# Patient Record
Sex: MALE | Race: WHITE | Employment: FULL TIME | URBAN - METROPOLITAN AREA
[De-identification: names, ages, dates, MRNs, and addresses within clinical notes are randomized per-mention and may not be internally consistent; named-entity substitution may affect disease eponyms.]

---

## 2018-06-07 ENCOUNTER — HOSPITAL ENCOUNTER (EMERGENCY)
Facility: CLINIC | Age: 23
Discharge: HOME | End: 2018-06-07
Attending: EMERGENCY MEDICINE | Admitting: EMERGENCY MEDICINE
Payer: COMMERCIAL

## 2018-06-07 VITALS
HEART RATE: 80 BPM | OXYGEN SATURATION: 100 % | DIASTOLIC BLOOD PRESSURE: 76 MMHG | SYSTOLIC BLOOD PRESSURE: 122 MMHG | RESPIRATION RATE: 18 BRPM | TEMPERATURE: 98 F

## 2018-06-07 DIAGNOSIS — F32.A DEPRESSION, UNSPECIFIED DEPRESSION TYPE: ICD-10-CM

## 2018-06-07 DIAGNOSIS — R45.851 SUICIDAL IDEATION: ICD-10-CM

## 2018-06-07 PROCEDURE — 90791 PSYCH DIAGNOSTIC EVALUATION: CPT

## 2018-06-07 PROCEDURE — 99285 EMERGENCY DEPT VISIT HI MDM: CPT | Mod: 25

## 2018-06-07 ASSESSMENT — ENCOUNTER SYMPTOMS
VOMITING: 0
DIARRHEA: 0
FEVER: 0
SHORTNESS OF BREATH: 0
HEADACHES: 0
NAUSEA: 0
CONSTIPATION: 0
HALLUCINATIONS: 0
EYES NEGATIVE: 1
ABDOMINAL PAIN: 0

## 2018-06-07 NOTE — ED TRIAGE NOTES
A&O x4, ABCDs intact. Pt arrives via EMS for suicidal ideation. Pt states he had a knife sitting in his lap today before a neighbor took it away. Pt has planned suicide in the past by tying a rope in a know, but did not follow-through.

## 2018-06-07 NOTE — ED AVS SNAPSHOT
[facility] Emergency Department    [address]    [facility] [address]    Phone:  [phone]    Fax:  [phone]                                       Major Woods   MRN: [id number]    Department:  [facility] Emergency Department   Date of Visit:  6/7/2018           After Visit Summary Signature Page     I have received my discharge instructions, and my questions have been answered. I have discussed any challenges I see with this plan with the nurse or doctor.    ..........................................................................................................................................  Patient/Patient Representative Signature      ..........................................................................................................................................  Patient Representative Print Name and Relationship to Patient    ..................................................               ................................................  Date                                            Time    ..........................................................................................................................................  Reviewed by Signature/Title    ...................................................              ..............................................  Date                                                            Time

## 2018-06-07 NOTE — ED TRIAGE NOTES
Per EMS patient called friends making suicidal comments. Friends called 911 and when police arrived patient was holding a knife stating he would cut his wrists. ABC intact without need for intervention.

## 2018-06-07 NOTE — ED PROVIDER NOTES
"  History     Chief Complaint:  Suicidal    The history is provided by the patient.      Major Woods is a 23 year old male with a history of depression who presents for evaluation of suicidal ideation. EMS report that the patient called his friends earlier today making suicidal comments. Friends called police, who found the patient on his front porch holding a knife. On presentation, the patient states that he was contemplating cutting himself with the knife, and states that he has intentionally cut his own arms and legs before. Patient's caretaker notes that the patient stopped taking his prescribed Concerta and Prozac 6 months ago, but that she could not force him to take them as he is above the age of 20. Patient lives at home with his fiance and her brother, and states that while this living situation is mostly safe, that it is stressful as his fiance wants to leave him. Patient additionally endorses a sporadic history of marijuana use. Patient denies hallucinations, fever, chest pain, shortness of breath, abdominal pain, nausea, vomiting, diarrhea, constipation, headaches, vision changes, alcohol or other \"hard drug\" intake, or other medical complaint.    Allergies:  Nickel      Medications:    The patient is not currently taking any prescribed medications.     Past Medical History:    ADHD  Anxiety  Depressive disorder    Past Surgical History:    Repair tendon, finger    Family History:    History reviewed. No pertinent family history.      Social History:  Presents with caretaker   Tobacco use: former smoker  Alcohol use: no  PC[facility]: [facility] [place]    Marital Status: Engaged     Review of Systems   Constitutional: Negative for fever.   Eyes: Negative.    Respiratory: Negative for shortness of breath.    Cardiovascular: Negative for chest pain.   Gastrointestinal: Negative for abdominal pain, constipation, diarrhea, nausea and vomiting.   Neurological: Negative for headaches. "   Psychiatric/Behavioral: Positive for suicidal ideas. Negative for hallucinations.   All other systems reviewed and are negative.    Physical Exam     Patient Vitals for the past 24 hrs:   BP Temp Temp src Pulse Heart Rate Resp SpO2   06/07/18 2017 122/76 98  F (36.7  C) Oral 80 80 18 100 %   06/07/18 2000 - 98.2  F (36.8  C) Oral - - - -   06/07/18 1800 126/77 - - 96 96 14 97 %      Physical Exam  General: Alert, appears well-developed and well-nourished. Cooperative.     In mild distress  HEENT:  Head:  Atraumatic  Ears:  External ears are normal  Mouth/Throat:  Oropharynx is without erythema or exudate and mucous membranes are moist.   Eyes:   Conjunctivae normal and EOM are normal. No scleral icterus.  CV:  Normal rate, regular rhythm, normal heart sounds and radial pulses are 2+ and symmetric.  No murmur.  Resp:  Breath sounds are clear bilaterally    Non-labored, no retractions or accessory muscle use  GI:  Abdomen is soft, no distension, no tenderness. No rebound or guarding. No CVA tenderness bilaterally  MS:  Normal range of motion. No edema.    Normal strength in all 4 extremities.     Back atraumatic.    No midline cervical, thoracic, or lumbar tenderness  Skin:  Warm and dry.  No rash or lesions noted.  Neuro:   Alert. Normal strength.   GCS: 15  Psych: Normal mood and affect. Depressed mood, flat affect. Endorses SI, denies HI, denies hallucinations.     Emergency Department Course     Emergency Department Course:  Past medical records, nursing notes, and vitals reviewed.  1845: I performed an exam of the patient and obtained history, as documented above.    1854: DEC evaluated the patient and relayed to me their findings.     1953: I rechecked the patient. Patient reiterates denial of suicidal thoughts, homicidal ideation, or hallucinations. Findings and plan explained to the Patient. Patient discharged home with instructions regarding supportive care, medications, and reasons to return. The  importance of close follow-up was reviewed.    Impression & Plan      Medical Decision Making:  Patient is a 23-year-old male who presents with increased depression and suicidal ideation.  Patient denies suicidal ideation here in the emergency department.  He states that he was feeling increasingly suicidal earlier today but does not actively have a plan.  Patient with no illicit substance use recently and denies alcohol abuse.  Patient states he currently lives with a significant other although they are undergoing a stressful time of their relationship.  He notes this stressor as well as other social factors in creating worsening depression recently.  Patient feels that he can contract for safety here in the emergency department.  He does have close follow-up with his primary care as needed and we gave him referral to mental health resources as needed. DEC evaluated the patient and felt comfortable with a close outpatient follow-up program.  Patient will seek additional resources through his primary care provider and potentially set up outpatient counseling here in the next 1 week.  He was encouraged to return to emergency department if he develops increasing suicidal thoughts, develops homicidal ideations or hallucinations.  He denies all of these on my final recheck prior to discharge.  All questions answered prior to discharge.  Return precautions understood prior to discharge.  Discharged home.    Diagnosis:    ICD-10-CM   1. Suicidal ideation R45.851   2. Depression, unspecified depression type F32.9       Disposition:  Discharged to home with plan as outlined.       I, Enrique Eugene, am serving as a scribe at 6:13 PM on 6/7/2018 to document services personally performed by Vel Hanson MD based on my observations and the provider's statements to me.    6/7/2018   [facility] EMERGENCY DEPARTMENT     Vel Hanson MD  06/07/18 9443

## 2018-06-08 NOTE — DISCHARGE INSTRUCTIONS
Discharge Instructions  Mental Health Concerns    You were seen today for mental health concerns, such as depression, anxiety, or suicidal thinking. Your provider feels that you do not require hospitalization at this time. However, your symptoms may become worse, and you may need to return to the Emergency Department. Most treatments of depression and suicidal thoughts are a process rather than a single intervention.  Medications and counseling can take several weeks or more to help.    Generally, every Emergency Department visit should have a follow-up clinic visit with either a primary or a specialty clinic/provider. Please follow-up as instructed by your emergency provider today.    By accepting these discharge instructions:    You promise to not harm yourself or others.    You agree that if you feel you are becoming unable to keep that promise, you will do something to help yourself before you do anything to harm yourself or others.     You agree to keep any safety plan arranged on your visit here today.    You agree to take any medication prescribed or recommended by your provider.    If you are getting worse, you can contact a friend or a family member, contact your counselor or family provider, contact a crisis line, or other options discussed with the provider or therapist today.    At any time, you can call 911 and return to the Emergency Department for more help.    You understand that follow-up is essential to your treatment, and you will make and keep appointments recommended on your visit today.    How to improve your mental health and prevent suicide:    Involve others by letting family, friends, counselors know.  Do not isolate yourself.    Avoid alcohol or drugs. Remove weapons, poisons from your home.    Try to stick to routines for eating, sleeping and getting regular exercise.      Try to get into sunlight. Bright natural light not only treats seasonal affective disorder but also  depression.    Increase safe activities that you enjoy.    If you feel worse, contact [phone] ([phone]), or call 911, or your primary provider/counselor for additional assistance.    If you were given a prescription for medicine here today, be sure to read all of the information (including the package insert) that comes with your prescription.  This will include important information about the medicine, its side effects, and any warnings that you need to know about.  The pharmacist who fills the prescription can provide more information and answer questions you may have about the medicine.  If you have questions or concerns that the pharmacist cannot address, please call or return to the Emergency Department.   Remember that you can always come back to the Emergency Department if you are not able to see your regular provider in the amount of time listed above, if you get any new symptoms, or if there is anything that worries you.

## 2018-06-08 NOTE — ED NOTES
Assume care of patient, introduce self as patient nurse. Patient currently laying in bed, appears in no acute distress, parents at bedside, sitter one on one watch, will continue to monitor.

## 2018-07-02 ENCOUNTER — HOSPITAL ENCOUNTER (EMERGENCY)
Facility: CLINIC | Age: 23
Discharge: HOME | End: 2018-07-02
Attending: PSYCHIATRY & NEUROLOGY | Admitting: PSYCHIATRY & NEUROLOGY
Payer: COMMERCIAL

## 2018-07-02 VITALS
DIASTOLIC BLOOD PRESSURE: 51 MMHG | WEIGHT: 154 LBS | HEART RATE: 86 BPM | TEMPERATURE: 98.3 F | OXYGEN SATURATION: 97 % | SYSTOLIC BLOOD PRESSURE: 120 MMHG

## 2018-07-02 DIAGNOSIS — F43.21 ADJUSTMENT DISORDER WITH DEPRESSED MOOD: ICD-10-CM

## 2018-07-02 LAB
AMPHETAMINES UR QL SCN: NEGATIVE
BARBITURATES UR QL: NEGATIVE
BENZODIAZ UR QL: NEGATIVE
CANNABINOIDS UR QL SCN: NEGATIVE
COCAINE UR QL: NEGATIVE
ETHANOL UR QL SCN: NEGATIVE
OPIATES UR QL SCN: NEGATIVE

## 2018-07-02 PROCEDURE — 90791 PSYCH DIAGNOSTIC EVALUATION: CPT

## 2018-07-02 PROCEDURE — 80320 DRUG SCREEN QUANTALCOHOLS: CPT | Performed by: FAMILY MEDICINE

## 2018-07-02 PROCEDURE — 99284 EMERGENCY DEPT VISIT MOD MDM: CPT | Mod: Z6 | Performed by: PSYCHIATRY & NEUROLOGY

## 2018-07-02 PROCEDURE — 99285 EMERGENCY DEPT VISIT HI MDM: CPT | Mod: 25

## 2018-07-02 PROCEDURE — 80307 DRUG TEST PRSMV CHEM ANLYZR: CPT | Performed by: FAMILY MEDICINE

## 2018-07-02 ASSESSMENT — ENCOUNTER SYMPTOMS
HALLUCINATIONS: 0
HYPERACTIVE: 0
SHORTNESS OF BREATH: 0
FEVER: 0
ABDOMINAL PAIN: 0
DYSPHORIC MOOD: 1

## 2018-07-02 NOTE — ED AVS SNAPSHOT
[facility], [place], Emergency Department    [address]    [place] [address]    Phone:  [phone]    Fax:  [phone]                                       Major Woods   MRN: [id number]    Department:  [facility], Emergency Department   Date of Visit:  7/2/2018           After Visit Summary Signature Page     I have received my discharge instructions, and my questions have been answered. I have discussed any challenges I see with this plan with the nurse or doctor.    ..........................................................................................................................................  Patient/Patient Representative Signature      ..........................................................................................................................................  Patient Representative Print Name and Relationship to Patient    ..................................................               ................................................  Date                                            Time    ..........................................................................................................................................  Reviewed by Signature/Title    ...................................................              ..............................................  Date                                                            Time

## 2018-07-02 NOTE — ED AVS SNAPSHOT
[facility], Emergency Department    [address]    [place] [address]    Phone:  [phone]    Fax:  [phone]                                       Major Woods   MRN: [id number]    Department:  [facility], Emergency Department   Date of Visit:  7/2/2018           Patient Information     Date Of Birth          1995        Your diagnoses for this visit were:     Adjustment disorder with depressed mood        You were seen by Gavin Rodríguez MD.        Discharge Instructions       Follow up with the new medication provider set up for Wed July 25, 2018 at 3 pm    Follow up with your primary MD    24 Hour Appointment Hotline       To make an appointment at any [facility], call [phone] ([phone]). If you don't have a family doctor or clinic, we will help you find one. [facility] clinics are conveniently located to serve the needs of you and your family.             Review of your medicines      Our records show that you are taking the medicines listed below. If these are incorrect, please call your family doctor or clinic.        Dose / Directions Last dose taken    ARIPIPRAZOLE PO   Dose:  10 mg   Indication:  Major Depressive Disorder        Take 10 mg by mouth daily   Refills:  0                Procedures and tests performed during your visit     Drug abuse screen 6 urine (tox)      Orders Needing Specimen Collection     None      Pending Results     No orders found from 6/30/2018 to 7/3/2018.            Pending Culture Results     No orders found from 6/30/2018 to 7/3/2018.            Pending Results Instructions     If you had any lab results that were not finalized at the time of your Discharge, you can call the ED Lab Result RN at [phone]. You will be contacted by this team for any positive Lab results or changes in treatment. The nurses are available 7 days a week from 10A to 6:30P.  You can leave a message 24 hours per day and they will return your call.       "  Thank you for choosing [facility]       Thank you for choosing [facility] for your care. Our goal is always to provide you with excellent care. Hearing back from our patients is one way we can continue to improve our services. Please take a few minutes to complete the written survey that you may receive in the mail after you visit with us. Thank you!        [employer] Information     [employer] lets you send messages to your doctor, view your test results, renew your prescriptions, schedule appointments and more. To sign up, go to [email or web address] . Click on \"Log in\" on the left side of the screen, which will take you to the Welcome page. Then click on \"Sign up Now\" on the right side of the page.     You will be asked to enter the access code listed below, as well as some personal information. Please follow the directions to create your username and password.     Your access code is: [email or web address]  Expires: 2018  7:59 PM     Your access code will  in 90 days. If you need help or a new code, please call your [facility] clinic or [phone].        Care EveryWhere ID     This is your Care EveryWhere ID. This could be used by other organizations to access your [facility] medical records  [id number]        Equal Access to Services     SWAPNA AJ : Gealcio mercadoo Nicolas, waaxda luqadaha, qaybta kaalmada adejasonyada, christopher mayfield. So [facility] [phone].    ATENCIÓN: Si habla español, tiene a santana disposición servicios gratuitos de asistencia lingüística. Llame al [phone].    We comply with applicable federal civil rights laws and [place] laws. We do not discriminate on the basis of race, color, national origin, age, disability, sex, sexual orientation, or gender identity.            After Visit Summary       This is your record. Keep this with you and show to your community pharmacist(s) and doctor(s) at your next visit.                  "

## 2018-07-03 NOTE — DISCHARGE INSTRUCTIONS
Follow up with the new medication provider set up for Wed July 25, 2018 at 3 pm    Follow up with your primary MD

## 2018-07-03 NOTE — ED PROVIDER NOTES
"  History     Chief Complaint   Patient presents with     Suicidal     suicidal thoughts with no plans.     The history is provided by the patient and medical records.     Major Woods is a 23 year old male who comes in due to him texting some comments like \"I'm not sure what I am going to do.\"  This happened after he broke up with his fiancee when he caught her with another cordell today.  He had suspicions before but it was denied by the fiancee. They had signed a lease for a year this spring, so he is worried about his living situation.  This was what the text was about and not about any suicidal thoughts. He is not interested in therapy as it has not been helpful for him in the past. He works an overnight shift as a . He is on abilify from his primary provider for depression and has found it helpful.  He denies any suicidal or homicidal thoughts.     Please see the 's assessment in EPIC from today (7/2/18) for further details.    I have reviewed the Medications, Allergies, Past Medical and Surgical History, and Social History in the Epic system.    Review of Systems   Constitutional: Negative for fever.   Respiratory: Negative for shortness of breath.    Cardiovascular: Negative for chest pain.   Gastrointestinal: Negative for abdominal pain.   Psychiatric/Behavioral: Positive for dysphoric mood. Negative for hallucinations, self-injury and suicidal ideas. The patient is not hyperactive.    All other systems reviewed and are negative.      Physical Exam   BP: 123/75  Pulse: 95  Temp: 99.1  F (37.3  C)  Weight: 69.9 kg (154 lb)      Physical Exam   Constitutional: He is oriented to person, place, and time. He appears well-developed and well-nourished.   HENT:   Head: Normocephalic and atraumatic.   Mouth/Throat: Oropharynx is clear and moist. No oropharyngeal exudate.   Eyes: Pupils are equal, round, and reactive to light.   Neck: Normal range of motion. Neck supple.   Cardiovascular: Normal rate, " regular rhythm and normal heart sounds.    Pulmonary/Chest: Effort normal and breath sounds normal. No respiratory distress.   Abdominal: Soft. Bowel sounds are normal. There is no tenderness.   Musculoskeletal: Normal range of motion.   Neurological: He is alert and oriented to person, place, and time.   Skin: Skin is warm. No rash noted.   Psychiatric: He has a normal mood and affect. His speech is normal and behavior is normal. Judgment and thought content normal. He is not actively hallucinating. Thought content is not paranoid and not delusional. Cognition and memory are normal. He expresses no homicidal and no suicidal ideation. He expresses no suicidal plans and no homicidal plans.   Major is a 24 y/o male who looks his age. He is slightly disheveled with good eye contact.   Nursing note and vitals reviewed.      ED Course     ED Course     Procedures               Labs Ordered and Resulted from Time of ED Arrival Up to the Time of Departure from the ED   DRUG ABUSE SCREEN 6 CHEM DEP URINE ([facility])            Assessments & Plan (with Medical Decision Making)   Major will be discharged home. He is not an imminent risk to himself or others.  His comments were misunderstood by his friends and he states he never had any suicidal thoughts.  He is not interested in therapy despite having issues with his relationship. He is interested in getting a medication provider to discuss his medications. He was set up with a provider on 7/25/18.      I have reviewed the nursing notes.    I have reviewed the findings, diagnosis, plan and need for follow up with the patient.    New Prescriptions    No medications on file       Final diagnoses:   Mood disorder (H)       7/2/2018   [facility], [place], EMERGENCY DEPARTMENT     Gavin Rodríguez MD  07/02/18 8014

## 2020-09-22 ENCOUNTER — HOSPITAL ENCOUNTER (EMERGENCY)
Facility: CLINIC | Age: 25
Discharge: HOME | End: 2020-09-22
Attending: EMERGENCY MEDICINE | Admitting: EMERGENCY MEDICINE
Payer: OTHER MISCELLANEOUS

## 2020-09-22 VITALS
TEMPERATURE: 98.4 F | RESPIRATION RATE: 16 BRPM | HEART RATE: 84 BPM | SYSTOLIC BLOOD PRESSURE: 121 MMHG | DIASTOLIC BLOOD PRESSURE: 94 MMHG | OXYGEN SATURATION: 99 %

## 2020-09-22 DIAGNOSIS — S61.216A LACERATION OF RIGHT LITTLE FINGER WITHOUT FOREIGN BODY WITHOUT DAMAGE TO NAIL, INITIAL ENCOUNTER: ICD-10-CM

## 2020-09-22 PROCEDURE — 99283 EMERGENCY DEPT VISIT LOW MDM: CPT

## 2020-09-22 PROCEDURE — 12001 RPR S/N/AX/GEN/TRNK 2.5CM/<: CPT

## 2020-09-22 RX ORDER — LIDOCAINE HYDROCHLORIDE AND EPINEPHRINE 10; 10 MG/ML; UG/ML
INJECTION, SOLUTION INFILTRATION; PERINEURAL
Status: DISCONTINUED
Start: 2020-09-22 | End: 2020-09-22 | Stop reason: HOSPADM

## 2020-09-22 ASSESSMENT — ENCOUNTER SYMPTOMS: WOUND: 1

## 2020-09-22 NOTE — ED PROVIDER NOTES
History     Chief Complaint:  Laceration    The history is provided by the patient.      Major Woods is a 25 year old male who presents with a laceration to his right fifth finger. The patient reports that he was working on a semi engine when one of the parts flipped over, pinching his finger, and ultimately sustaining a laceration over his pip knuckle. Last known Tdap was in 2019.     Allergies:  Nickel  Penicillins     Medications:    Aripiprazole     Past Medical History:    ADHD   Anxiety  Depression    Past Surgical History:    Repair tendons in fingers    Family History:    No past pertinent family history.    Social History:  Smoking Status: Never Smoker  Smokeless Tobacco: Current User  Alcohol Use: Negative  Drug Use: Positive   Type: Marijuana  PCP: [facility]    Marital Status:  Single     Review of Systems   Skin: Positive for wound.   All other systems reviewed and are negative.    Physical Exam     Patient Vitals for the past 24 hrs:   BP Temp Temp src Pulse Resp SpO2   09/22/20 0022 121/94 98.4  F (36.9  C) Oral 84 16 99 %       Physical Exam  Nursing note and vitals reviewed.  Constitutional: Cooperative.   Cardiovascular: Normal rate, regular rhythm. 2+ right radial pulse.  Normal perfusion in distal right 5th finger.   Pulmonary/Chest: Effort normal.   Musculoskeletal: Normal range of motion of right 5th finger. Normal extension against resistance.    Neurological: Alert. Sensation in distal right 5th finger   Skin: Skin is warm and dry. Laceration of dorsum of PIP knuckle on fifth finger.   Psychiatric: Normal mood and affect.     Emergency Department Course     Procedures    Laceration Repair        LACERATION:  A simple and superficial clean 1.0 cm laceration.      LOCATION:  Pip knuckle of right fifth finger      FUNCTION:  Distally sensation, circulation, motor and tendon function are intact.      ANESTHESIA:  Local using 1% Lidocaine with Epinephrine total of 2  mLs      PREPARATION:  Irrigation and Scrubbing with Normal Saline and Shur Clens      DEBRIDEMENT:  no debridement      CLOSURE:  Wound was closed with One Layer.  Skin closed with 3 x 4.0 Ethylon using interrupted sutures.    Emergency Department Course:  Past medical records, nursing notes, and vitals reviewed.    0032 I performed an exam of the patient as documented above.     0056 I rechecked the patient and discussed the results of his workup thus far.     Findings and plan explained to the Patient. Patient discharged home with instructions regarding supportive care, medications, and reasons to return. The importance of close follow-up was reviewed.     I personally answered all related questions prior to discharge.     Impression & Plan     Medical Decision Making:  Major Woods is a 25 year old male who presents for evaluation of a laceration to the pip knuckle of his right fifth finger.  The wound was carefully evaluated and explored.  The laceration was closed with sutures as noted above.  There is no evidence of muscular, tendon, or bony damage with this laceration.  No signs of foreign body.  Possible complications (infection, scarring) were reviewed with the patient.  Follow up with primary care as noted in the discharge section.     Diagnosis:    ICD-10-CM    1. Laceration of right little finger without foreign body without damage to nail, initial encounter  S61.216A        Disposition:  Discharged to home.    Scribe Disclosure:  I, Bhupendra Figueroa, am serving as a scribe at 12:30 AM on 9/22/2020 to document services personally performed by Malik Weinstein MD based on my observations and the provider's statements to me.        Malik Weinstein MD  09/22/20 0231

## 2020-09-22 NOTE — LETTER
September 22, 2020      To Whom It May Concern:      Major Woods was seen in our Emergency Department today, 09/22/20.  I expect his condition to improve over the next 1-2 days.  He may return to work/school when improved.    Sincerely,        JOHN De Jesus

## 2020-09-22 NOTE — ED AVS SNAPSHOT
[facility]  [address]  [facility] [address]  Phone:  [phone]  Fax:  [phone]                                    Major Woods   MRN: [id number]    Department:  [facility] Emergency [facility]   Date of Visit:  9/22/2020           After Visit Summary Signature Page    I have received my discharge instructions, and my questions have been answered. I have discussed any challenges I see with this plan with the nurse or doctor.    ..........................................................................................................................................  Patient/Patient Representative Signature      ..........................................................................................................................................  Patient Representative Print Name and Relationship to Patient    ..................................................               ................................................  Date                                   Time    ..........................................................................................................................................  Reviewed by Signature/Title    ...................................................              ..............................................  Date                                               Time          22EPIC Rev 08/18

## 2020-09-22 NOTE — ED NOTES
Pt provided with discharge paperwork and educated on recommended follow-up with PCP. Pt educated on how to manage symptoms at home along with sign/symptoms of infection. Education provided on when to get stiches out. Pt voiced understanding and denied any questions at discharge.

## 2020-09-29 ENCOUNTER — HOSPITAL ENCOUNTER (EMERGENCY)
Facility: CLINIC | Age: 25
Discharge: HOME | End: 2020-09-29
Admitting: EMERGENCY MEDICINE
Payer: COMMERCIAL

## 2020-09-29 VITALS
DIASTOLIC BLOOD PRESSURE: 82 MMHG | SYSTOLIC BLOOD PRESSURE: 126 MMHG | HEART RATE: 82 BPM | OXYGEN SATURATION: 98 % | TEMPERATURE: 98.2 F | RESPIRATION RATE: 16 BRPM

## 2020-09-29 PROCEDURE — 40000268 ZZH STATISTIC NO CHARGES

## 2020-09-29 NOTE — LETTER
September 29, 2020      To Whom It May Concern:      Major Woods was seen in our Emergency Department today, 09/29/20.  Pt need to continue to wear splint for 5 more days while at work.  To continue to let the wound heal.    Sincerely,        Kevin Jolley RN